# Patient Record
Sex: FEMALE | Race: WHITE | NOT HISPANIC OR LATINO | URBAN - METROPOLITAN AREA
[De-identification: names, ages, dates, MRNs, and addresses within clinical notes are randomized per-mention and may not be internally consistent; named-entity substitution may affect disease eponyms.]

---

## 2020-06-04 ENCOUNTER — OFFICE VISIT (OUTPATIENT)
Dept: URBAN - METROPOLITAN AREA CLINIC 113 | Facility: CLINIC | Age: 85
End: 2020-06-04

## 2020-07-25 ENCOUNTER — TELEPHONE ENCOUNTER (OUTPATIENT)
Dept: URBAN - METROPOLITAN AREA CLINIC 13 | Facility: CLINIC | Age: 85
End: 2020-07-25

## 2020-07-25 RX ORDER — DICYCLOMINE HYDROCHLORIDE 10 MG/1
TAKE 1 CAPSULE AS NEEDED EVERY 6 HOURS CAPSULE ORAL
Qty: 60 | Refills: 0 | OUTPATIENT
Start: 2019-01-08 | End: 2020-06-04

## 2020-07-25 RX ORDER — OLMESARTAN MEDOXOMIL-HYDROCHLOROTHIAZIDE 12.5; 2 MG/1; MG/1
TAKE 1 TABLET DAILY TABLET, FILM COATED ORAL
Refills: 0 | OUTPATIENT
End: 2019-01-04

## 2020-07-26 ENCOUNTER — TELEPHONE ENCOUNTER (OUTPATIENT)
Dept: URBAN - METROPOLITAN AREA CLINIC 13 | Facility: CLINIC | Age: 85
End: 2020-07-26

## 2020-07-26 RX ORDER — OSPEMIFENE 60 MG/1
TAKE 1 TABLET BY MOUTH EVERY DAY WITH FOOD TABLET, FILM COATED ORAL
Qty: 10 | Refills: 0 | Status: ACTIVE | COMMUNITY
Start: 2020-02-10

## 2020-07-26 RX ORDER — AZITHROMYCIN DIHYDRATE 250 MG/1
TABLET, FILM COATED ORAL
Qty: 6 | Refills: 0 | Status: ACTIVE | COMMUNITY
Start: 2018-04-26

## 2020-07-26 RX ORDER — BENZONATATE 200 MG/1
TAKE 1 CAPSULE BY MOUTH EVERY DAY 3 TIMES A DAY AS NEEDED FOR COUGH, N CAPSULE ORAL
Qty: 30 | Refills: 0 | Status: ACTIVE | COMMUNITY
Start: 2019-11-04

## 2020-07-26 RX ORDER — BENZONATATE 100 MG/1
TAKE 1 CAPSULE (100 MG TOTAL) BY MOUTH 2 (TWO) TIMES DAILY AS NEEDED F CAPSULE ORAL
Qty: 20 | Refills: 0 | Status: ACTIVE | COMMUNITY
Start: 2019-04-26

## 2020-07-26 RX ORDER — CLINDAMYCIN HYDROCHLORIDE 300 MG/1
TAKE 1 CAPSULE BY MOUTH THREE TIMES A DAY TILL GONE CAPSULE ORAL
Qty: 21 | Refills: 0 | Status: ACTIVE | COMMUNITY
Start: 2019-06-17

## 2020-07-26 RX ORDER — HYDROCHLOROTHIAZIDE 12.5 MG/1
TAKE 1 TABLET DAILY IN THE MORNING TABLET ORAL
Refills: 0 | Status: ACTIVE | COMMUNITY
Start: 2018-03-16

## 2020-07-26 RX ORDER — NITROFURANTOIN MONOHYDRATE/MACROCRYSTALLINE 25; 75 MG/1; MG/1
CAPSULE ORAL
Qty: 10 | Refills: 0 | Status: ACTIVE | COMMUNITY
Start: 2019-09-06

## 2020-07-26 RX ORDER — DOXYCYCLINE 100 MG/1
CAPSULE ORAL
Qty: 20 | Refills: 0 | Status: ACTIVE | COMMUNITY
Start: 2019-07-03

## 2020-07-26 RX ORDER — OLMESARTAN MEDOXOMIL 20 MG/1
TAKE 1 TABLET DAILY TABLET, FILM COATED ORAL
Refills: 0 | Status: ACTIVE | COMMUNITY
Start: 2018-04-27

## 2020-07-26 RX ORDER — DOXYCYCLINE HYCLATE 100 MG/1
TABLET ORAL
Qty: 14 | Refills: 0 | Status: ACTIVE | COMMUNITY
Start: 2019-12-24

## 2020-07-26 RX ORDER — METHYLPREDNISOLONE 4 MG/1
TAKE 6 TABLETS ON DAY 1 AS DIRECTED ON PACKAGE AND DECREASE BY 1 TAB E TABLET ORAL
Qty: 21 | Refills: 0 | Status: ACTIVE | COMMUNITY
Start: 2019-07-01

## 2020-07-26 RX ORDER — AZITHROMYCIN DIHYDRATE 250 MG/1
TABLET, FILM COATED ORAL
Qty: 6 | Refills: 0 | Status: ACTIVE | COMMUNITY
Start: 2019-05-17

## 2020-07-26 RX ORDER — NEOMYCIN SULFATE, POLYMYXIN B SULFATE AND DEXAMETHASONE 3.5; 10000; 1 MG/ML; [USP'U]/ML; MG/ML
SUSPENSION/ DROPS OPHTHALMIC
Qty: 5 | Refills: 0 | Status: ACTIVE | COMMUNITY
Start: 2018-11-06

## 2020-07-26 RX ORDER — DOXYCYCLINE HYCLATE 100 MG/1
TAKE 1 TABLET BY MOUTH TWICE A DAY FOR 10 DAYS TABLET ORAL
Qty: 20 | Refills: 0 | Status: ACTIVE | COMMUNITY
Start: 2018-08-29

## 2020-07-26 RX ORDER — PREDNISONE 20 MG/1
TABLET ORAL
Qty: 5 | Refills: 0 | Status: ACTIVE | COMMUNITY
Start: 2019-05-02

## 2020-07-26 RX ORDER — CLINDAMYCIN HYDROCHLORIDE 300 MG/1
TAKE 1 CAPSULE BY MOUTH 3 TIMES A DAY FOR 10 DAYS WITH A PROBIOTIC CAPSULE ORAL
Qty: 30 | Refills: 0 | Status: ACTIVE | COMMUNITY
Start: 2018-11-06

## 2020-07-26 RX ORDER — FUROSEMIDE 20 MG/1
TAKE 1 TABLET (20 MG TOTAL) BY MOUTH 2 (TWO) TIMES DAILY FOR 10 DAYS TABLET ORAL
Qty: 20 | Refills: 0 | Status: ACTIVE | COMMUNITY
Start: 2020-04-26

## 2020-07-26 RX ORDER — FLUCONAZOLE 150 MG/1
TABLET ORAL
Qty: 1 | Refills: 0 | Status: ACTIVE | COMMUNITY
Start: 2018-09-17

## 2020-07-26 RX ORDER — NITROFURANTOIN 100 MG/1
CAPSULE ORAL
Qty: 10 | Refills: 0 | Status: ACTIVE | COMMUNITY
Start: 2019-03-13

## 2020-07-26 RX ORDER — FOSFOMYCIN TROMETHAMINE 3 G/1
TK 1 PACKET PO ONCE POWDER ORAL
Qty: 1 | Refills: 0 | Status: ACTIVE | COMMUNITY
Start: 2019-09-05

## 2020-07-26 RX ORDER — NEOMYCIN SULFATE, POLYMYXIN B SULFATE AND DEXAMETHASONE 3.5; 10000; 1 MG/ML; [USP'U]/ML; MG/ML
PLEASE CLARIFY DIRECTION SUSPENSION/ DROPS OPHTHALMIC
Qty: 5 | Refills: 0 | Status: ACTIVE | COMMUNITY
Start: 2019-07-18

## 2020-07-26 RX ORDER — HYDROCODONE BITARTRATE AND ACETAMINOPHEN 5; 325 MG/1; MG/1
TABLET ORAL
Qty: 18 | Refills: 0 | Status: ACTIVE | COMMUNITY
Start: 2019-06-06

## 2020-07-26 RX ORDER — VANCOMYCIN HYDROCHLORIDE 125 MG/1
CAPSULE ORAL
Qty: 40 | Refills: 0 | Status: ACTIVE | COMMUNITY
Start: 2019-07-09

## 2020-07-26 RX ORDER — HYDROCHLOROTHIAZIDE 12.5 MG/1
TAKE 1 CAPSULE (12.5 MG TOTAL) BY MOUTH EVERY OTHER DAY CAPSULE ORAL
Qty: 45 | Refills: 0 | Status: ACTIVE | COMMUNITY
Start: 2020-04-07

## 2020-07-26 RX ORDER — CONJUGATED ESTROGENS 0.62 MG/G
CREAM VAGINAL
Qty: 30 | Refills: 0 | Status: ACTIVE | COMMUNITY
Start: 2019-04-01

## 2020-07-26 RX ORDER — AZITHROMYCIN DIHYDRATE 250 MG/1
TABLET, FILM COATED ORAL
Qty: 6 | Refills: 0 | Status: ACTIVE | COMMUNITY
Start: 2018-06-15

## 2020-07-26 RX ORDER — HYDROCODONE BITARTRATE AND HOMATROPINE METHYLBROMIDE 5; 1.5 MG/5ML; MG/5ML
TAKE 5MLS BY MOUTH EVERY 6 HOURS AS NEEDED FOR UP TO 10 DAYS SYRUP ORAL
Qty: 240 | Refills: 0 | Status: ACTIVE | COMMUNITY
Start: 2020-01-07

## 2020-07-26 RX ORDER — DIPHENOXYLATE HYDROCHLORIDE AND ATROPINE SULFATE 2.5; .025 MG/1; MG/1
TAKE ONE TABLET BY MOUTH 4 TIMES A DAY AS NEEDED FOR DIARRHEA TABLET ORAL
Qty: 20 | Refills: 0 | Status: ACTIVE | COMMUNITY
Start: 2020-03-20

## 2020-07-26 RX ORDER — AZITHROMYCIN DIHYDRATE 250 MG/1
TAKE 2 TABS ON DAY ONE THEN ONE TAB DAILY FOR REMAINING FOUR DAYS TABLET, FILM COATED ORAL
Qty: 6 | Refills: 0 | Status: ACTIVE | COMMUNITY
Start: 2020-05-11

## 2020-11-04 ENCOUNTER — OFFICE VISIT (OUTPATIENT)
Dept: URBAN - METROPOLITAN AREA CLINIC 113 | Facility: CLINIC | Age: 85
End: 2020-11-04
Payer: MEDICARE

## 2020-11-04 VITALS
WEIGHT: 115 LBS | DIASTOLIC BLOOD PRESSURE: 78 MMHG | HEIGHT: 61 IN | SYSTOLIC BLOOD PRESSURE: 152 MMHG | RESPIRATION RATE: 18 BRPM | HEART RATE: 84 BPM | BODY MASS INDEX: 21.71 KG/M2 | TEMPERATURE: 97.4 F

## 2020-11-04 DIAGNOSIS — R14.0 ABDOMINAL BLOATING: ICD-10-CM

## 2020-11-04 DIAGNOSIS — K59.09 OTHER CONSTIPATION: ICD-10-CM

## 2020-11-04 PROCEDURE — G8427 DOCREV CUR MEDS BY ELIG CLIN: HCPCS | Performed by: NURSE PRACTITIONER

## 2020-11-04 PROCEDURE — 99213 OFFICE O/P EST LOW 20 MIN: CPT | Performed by: NURSE PRACTITIONER

## 2020-11-04 RX ORDER — FUROSEMIDE 20 MG/1
TAKE 1 TABLET (20 MG TOTAL) BY MOUTH 2 (TWO) TIMES DAILY FOR 10 DAYS TABLET ORAL
Qty: 20 | Refills: 0 | Status: ACTIVE | COMMUNITY
Start: 2020-04-26

## 2020-11-04 RX ORDER — HYDROCHLOROTHIAZIDE 12.5 MG/1
TAKE 1 TABLET DAILY IN THE MORNING TABLET ORAL
Refills: 0 | Status: ACTIVE | COMMUNITY
Start: 2018-03-16

## 2020-11-04 NOTE — HPI-TODAY'S VISIT:
This is an 85-year-old female with a history of colon cancer status post partial colectomy, hypertension, prediabetes, bloating, and abdominal discomfort presenting for follow-up.    She was last seen 6/4/2020.  She had tried MiraLAX for constipation and reported forceful bowel movements.  She returned to Colace twice a day and was having satisfying bowel movements.  She denied rectal bleeding.  She had recurrent abdominal hernias for which she was not a surgical candidate.  She was given samples of align for a complaint of bloating. She did not try a probiotic.  She reports worsening bloating over the last 3 weeks.  She states she feels bloated all the time.  She has also noticed fat along her back that she has not had before.  She has not changed her diet.  She has gained 4 pounds.  She reports intermittent pressure type discomfort associated with bloating.  She is taking Colace every evening.  She is having 4 or 5 bowel movements a week.  Occasionally, she has 2 days when she does not have a bowel movement.  Her stools are frequently hard and even small pieces.  She reports discomfort in her rectum associated with being constipated.  She has minimal gas.  She denies any other abdominal symptoms.

## 2020-12-22 ENCOUNTER — OFFICE VISIT (OUTPATIENT)
Dept: URBAN - METROPOLITAN AREA CLINIC 113 | Facility: CLINIC | Age: 85
End: 2020-12-22
Payer: MEDICARE

## 2020-12-22 VITALS
WEIGHT: 119 LBS | HEART RATE: 72 BPM | TEMPERATURE: 98 F | RESPIRATION RATE: 18 BRPM | HEIGHT: 61 IN | BODY MASS INDEX: 22.47 KG/M2

## 2020-12-22 DIAGNOSIS — K64.1 GRADE II HEMORRHOIDS: ICD-10-CM

## 2020-12-22 DIAGNOSIS — R14.0 ABDOMINAL BLOATING: ICD-10-CM

## 2020-12-22 DIAGNOSIS — K59.09 OTHER CONSTIPATION: ICD-10-CM

## 2020-12-22 PROCEDURE — 99213 OFFICE O/P EST LOW 20 MIN: CPT | Performed by: INTERNAL MEDICINE

## 2020-12-22 RX ORDER — SIMETHICONE 125 MG/1
1 TABLET AFTER MEALS AND AT BEDTIME AS NEEDED TABLET, CHEWABLE ORAL
Qty: 120 | Refills: 1 | OUTPATIENT

## 2020-12-22 RX ORDER — FUROSEMIDE 20 MG/1
TAKE 1 TABLET (20 MG TOTAL) BY MOUTH 2 (TWO) TIMES DAILY FOR 10 DAYS TABLET ORAL
Qty: 20 | Refills: 0 | Status: ACTIVE | COMMUNITY
Start: 2020-04-26

## 2020-12-22 RX ORDER — HYDROCHLOROTHIAZIDE 12.5 MG/1
TAKE 1 TABLET DAILY IN THE MORNING TABLET ORAL
Refills: 0 | Status: ACTIVE | COMMUNITY
Start: 2018-03-16

## 2020-12-22 NOTE — HPI-TODAY'S VISIT:
86 y/o female presenting for a f/u visit. She was last seen in the clinic about 6 weeks ago. She was recommended to follow a Low-Fodmap diet as well as daily fiber/probiotic at her last visit. She continues to have intermittent bloating and abdominal discomfort. She also has hemorrhoids. She had hemorrhoids last year which were managed conservatively.   She did have an abdominal Xray which was unremarkable.   11/4/2020 This is an 85-year-old female with a history of colon cancer status post partial colectomy, hypertension, prediabetes, bloating, and abdominal discomfort presenting for follow-up.    She was last seen 6/4/2020.  She had tried MiraLAX for constipation and reported forceful bowel movements.  She returned to Colace twice a day and was having satisfying bowel movements.  She denied rectal bleeding.  She had recurrent abdominal hernias for which she was not a surgical candidate.  She was given samples of align for a complaint of bloating. She did not try a probiotic.  She reports worsening bloating over the last 3 weeks.  She states she feels bloated all the time.  She has also noticed fat along her back that she has not had before.  She has not changed her diet.  She has gained 4 pounds.  She reports intermittent pressure type discomfort associated with bloating.  She is taking Colace every evening.  She is having 4 or 5 bowel movements a week.  Occasionally, she has 2 days when she does not have a bowel movement.  Her stools are frequently hard and even small pieces.  She reports discomfort in her rectum associated with being constipated.  She has minimal gas.  She denies any other abdominal symptoms.

## 2021-01-22 ENCOUNTER — WEB ENCOUNTER (OUTPATIENT)
Dept: URBAN - METROPOLITAN AREA CLINIC 113 | Facility: CLINIC | Age: 86
End: 2021-01-22

## 2021-01-22 ENCOUNTER — OFFICE VISIT (OUTPATIENT)
Dept: URBAN - METROPOLITAN AREA CLINIC 113 | Facility: CLINIC | Age: 86
End: 2021-01-22
Payer: MEDICARE

## 2021-01-22 VITALS
DIASTOLIC BLOOD PRESSURE: 80 MMHG | SYSTOLIC BLOOD PRESSURE: 162 MMHG | TEMPERATURE: 98.2 F | HEART RATE: 82 BPM | RESPIRATION RATE: 18 BRPM | WEIGHT: 121 LBS | BODY MASS INDEX: 22.84 KG/M2 | HEIGHT: 61 IN

## 2021-01-22 DIAGNOSIS — K59.09 OTHER CONSTIPATION: ICD-10-CM

## 2021-01-22 DIAGNOSIS — R19.4 CHANGE IN BOWEL HABITS: ICD-10-CM

## 2021-01-22 DIAGNOSIS — R14.0 ABDOMINAL BLOATING: ICD-10-CM

## 2021-01-22 PROCEDURE — G8427 DOCREV CUR MEDS BY ELIG CLIN: HCPCS | Performed by: NURSE PRACTITIONER

## 2021-01-22 PROCEDURE — G9903 PT SCRN TBCO ID AS NON USER: HCPCS | Performed by: NURSE PRACTITIONER

## 2021-01-22 PROCEDURE — 99213 OFFICE O/P EST LOW 20 MIN: CPT | Performed by: NURSE PRACTITIONER

## 2021-01-22 RX ORDER — FUROSEMIDE 20 MG/1
TAKE 1 TABLET (20 MG TOTAL) BY MOUTH 2 (TWO) TIMES DAILY FOR 10 DAYS TABLET ORAL
Qty: 20 | Refills: 0 | Status: ACTIVE | COMMUNITY
Start: 2020-04-26

## 2021-01-22 RX ORDER — SIMETHICONE 125 MG/1
1 TABLET AFTER MEALS AND AT BEDTIME AS NEEDED TABLET, CHEWABLE ORAL
Qty: 120 | Refills: 1 | Status: ACTIVE | COMMUNITY

## 2021-01-22 RX ORDER — HYDROCHLOROTHIAZIDE 12.5 MG/1
TAKE 1 TABLET DAILY IN THE MORNING TABLET ORAL
Refills: 0 | Status: ACTIVE | COMMUNITY
Start: 2018-03-16

## 2021-01-22 NOTE — HPI-TODAY'S VISIT:
This is an 85-year-old female with a history of colon cancer status post partial colectomy, hypertension, prediabetes, bloating, abdominal discomfort, constipation, and internal hemorrhoids presenting for follow-up.  She was last seen 12/22/20.  She had persistent bloating will likely related to her diet.  She was prescribed simethicone chewables.  She was to try hydrocortisone suppositories for hemorrhoidal symptoms.  Hemorrhoidal banding was a consideration.  At a previous visit in November, she was instructed to begin a diet low in fermentable sugars and begin a daily probiotic that had been recommended at a previous visit.  She was to try daily fiber with milk of magnesia as needed for constipation.  She continues to complain of bloating, particularly in the lower part of her abdomen.  She denies associated pain.  She denies gas.  She has experienced a recent change in bowel habits.  She has a bowel movement every day with a sensation of incomplete evacuation.  Later, she will return to the bathroom for a bowel movement and will pass "mushy" stools that is orange in color with black specks.  This stool is particularly malodorous.  She denies diarrhea.  She is taking milk of magnesia every other day to control constipation.  She denies any other abdominal symptoms.  She denies weight loss.  She denies recent antibiotic use.

## 2021-04-21 ENCOUNTER — OFFICE VISIT (OUTPATIENT)
Dept: URBAN - METROPOLITAN AREA CLINIC 113 | Facility: CLINIC | Age: 86
End: 2021-04-21
Payer: MEDICARE

## 2021-04-21 VITALS
BODY MASS INDEX: 22.28 KG/M2 | HEIGHT: 61 IN | HEART RATE: 76 BPM | DIASTOLIC BLOOD PRESSURE: 82 MMHG | TEMPERATURE: 98.3 F | SYSTOLIC BLOOD PRESSURE: 149 MMHG | WEIGHT: 118 LBS

## 2021-04-21 DIAGNOSIS — R14.0 BLOATING: ICD-10-CM

## 2021-04-21 DIAGNOSIS — R10.2 PELVIC PAIN: ICD-10-CM

## 2021-04-21 DIAGNOSIS — R10.84 GENERALIZED ABDOMINAL PAIN: ICD-10-CM

## 2021-04-21 PROCEDURE — 99214 OFFICE O/P EST MOD 30 MIN: CPT | Performed by: INTERNAL MEDICINE

## 2021-04-21 RX ORDER — SIMETHICONE 125 MG/1
1 TABLET AFTER MEALS AND AT BEDTIME AS NEEDED TABLET, CHEWABLE ORAL
Qty: 120 | Refills: 1 | Status: ACTIVE | COMMUNITY

## 2021-04-21 RX ORDER — FUROSEMIDE 20 MG/1
TAKE 1 TABLET (20 MG TOTAL) BY MOUTH 2 (TWO) TIMES DAILY FOR 10 DAYS TABLET ORAL
Qty: 20 | Refills: 0 | Status: ON HOLD | COMMUNITY
Start: 2020-04-26

## 2021-04-21 RX ORDER — HYDROCHLOROTHIAZIDE 12.5 MG/1
TAKE 1 TABLET DAILY IN THE MORNING TABLET ORAL
Refills: 0 | Status: ACTIVE | COMMUNITY
Start: 2018-03-16

## 2021-04-21 NOTE — HPI-TODAY'S VISIT:
85-year-old female presenting for follow-up.  She was previously seen in the clinic in January 2021. She currently has a bladder infection. She has rectal burning intermittently. She has not been feeling well for the past few weeks.   She has been habing increased abdominal and pelvic swelling. She was dx with a UTI and was placed on antibiotics. She does not feel like the abx are helping any. She is being referred to the urologist as well. She does not remember every having a urinary tract infection.   1/22/2021 This is an 85-year-old female with a history of colon cancer status post partial colectomy, hypertension, prediabetes, bloating, abdominal discomfort, constipation, and internal hemorrhoids presenting for follow-up.  She was last seen 12/22/20.  She had persistent bloating will likely related to her diet.  She was prescribed simethicone chewables.  She was to try hydrocortisone suppositories for hemorrhoidal symptoms.  Hemorrhoidal banding was a consideration.  At a previous visit in November, she was instructed to begin a diet low in fermentable sugars and begin a daily probiotic that had been recommended at a previous visit.  She was to try daily fiber with milk of magnesia as needed for constipation.  She continues to complain of bloating, particularly in the lower part of her abdomen.  She denies associated pain.  She denies gas.  She has experienced a recent change in bowel habits.  She has a bowel movement every day with a sensation of incomplete evacuation.  Later, she will return to the bathroom for a bowel movement and will pass "mushy" stools that is orange in color with black specks.  This stool is particularly malodorous.  She denies diarrhea.  She is taking milk of magnesia every other day to control constipation.  She denies any other abdominal symptoms.  She denies weight loss.  She denies recent antibiotic use.

## 2021-05-10 ENCOUNTER — TELEPHONE ENCOUNTER (OUTPATIENT)
Dept: URBAN - METROPOLITAN AREA CLINIC 113 | Facility: CLINIC | Age: 86
End: 2021-05-10

## 2021-05-11 ENCOUNTER — TELEPHONE ENCOUNTER (OUTPATIENT)
Dept: URBAN - METROPOLITAN AREA CLINIC 113 | Facility: CLINIC | Age: 86
End: 2021-05-11

## 2021-06-07 ENCOUNTER — OFFICE VISIT (OUTPATIENT)
Dept: URBAN - METROPOLITAN AREA CLINIC 113 | Facility: CLINIC | Age: 86
End: 2021-06-07
Payer: MEDICARE

## 2021-06-07 VITALS
WEIGHT: 117 LBS | HEIGHT: 61 IN | HEART RATE: 67 BPM | RESPIRATION RATE: 20 BRPM | TEMPERATURE: 98.2 F | DIASTOLIC BLOOD PRESSURE: 64 MMHG | SYSTOLIC BLOOD PRESSURE: 120 MMHG | BODY MASS INDEX: 22.09 KG/M2

## 2021-06-07 DIAGNOSIS — K59.09 OTHER CONSTIPATION: ICD-10-CM

## 2021-06-07 DIAGNOSIS — R14.0 ABDOMINAL BLOATING: ICD-10-CM

## 2021-06-07 DIAGNOSIS — R10.32 LEFT LOWER QUADRANT ABDOMINAL PAIN: ICD-10-CM

## 2021-06-07 PROCEDURE — 99213 OFFICE O/P EST LOW 20 MIN: CPT | Performed by: INTERNAL MEDICINE

## 2021-06-07 RX ORDER — FUROSEMIDE 20 MG/1
TAKE 1 TABLET (20 MG TOTAL) BY MOUTH 2 (TWO) TIMES DAILY FOR 10 DAYS TABLET ORAL
Qty: 20 | Refills: 0 | Status: ON HOLD | COMMUNITY
Start: 2020-04-26

## 2021-06-07 RX ORDER — OLMESARTAN MEDOXOMIL 20 MG/1
1 TABLET TABLET ORAL ONCE A DAY
Status: ACTIVE | COMMUNITY

## 2021-06-07 RX ORDER — HYDROCHLOROTHIAZIDE 12.5 MG/1
TAKE 1 TABLET DAILY IN THE MORNING TABLET ORAL
Refills: 0 | Status: ACTIVE | COMMUNITY
Start: 2018-03-16

## 2021-06-07 RX ORDER — SIMETHICONE 125 MG/1
1 TABLET AFTER MEALS AND AT BEDTIME AS NEEDED TABLET, CHEWABLE ORAL
Qty: 120 | Refills: 1 | Status: ACTIVE | COMMUNITY

## 2021-06-07 NOTE — HPI-TODAY'S VISIT:
This is an 85-year-old female with a history of colon cancer status post partial colectomy, hypertension, prediabetes, bloating, abdominal discomfort, constipation, and internal hemorrhoids presenting for follow-up.   She was last seen 4/21/2021.  At the time, she had a bladder infection and reported intermittent rectal burning.  She had been feeling unwell for several weeks.  She reported increased abdominal and pelvic swelling.  She was compliant with antibiotics for the UTI but did not feel as though her symptoms were improving.  She was awaiting for an appointment with a neurologist.  She had previously undergone an abdominal film that showed small bowel air-fluid levels.  Intermittent ileus was a consideration.  Functional abdominal pain with intermittent constipation was also within the differential.  She was scheduled for a CT scan.  She continues to report intermittent episodes of left lower quadrant pain.  She reports occasional "attacks" that are usually relieved with walking.  These episodes are unassociated with meals.  She reports "discomfort" associated with constipation.  She remains concerned regarding the appearance of the asymmetry in her lower abdomen.  She continues to report constipation.  She is on Colace every day.  She reports a small, hard stools over the last 7 days.  She feels a swollen in the left lower abdomen and is "uncomfortable."  She has tried Benefiber in the past and reports it caused nausea.  She has not tried taking milk of magnesia although this has been previously recommended.  She reports recurrent urinary tract infections.  She had been prescribed a suppository.  She denies other abdominal symptoms.

## 2021-06-07 NOTE — HPI-OTHER HISTORIES
CT of the abdomen and pelvis without contrast 5/12/2021:No acute findings in the abdomen and pelvis.  Stable mild atelectasis and stable basilar emphysema.  Small ill-defined bilateral upper pole renal cysts.  Colonic diverticulosis without evidence of diverticulitis.  Stable postsurgical changes of left abdominal wall mesh.  Small fat filled bilateral inguinal hernias and stable uterine calcifications.  Stable DISH, multilevel disc height loss, grade 1 anterolisthesis L4 on L5.  Stable aortoiliac atherosclerotic calcifications.

## 2021-07-28 ENCOUNTER — OFFICE VISIT (OUTPATIENT)
Dept: URBAN - METROPOLITAN AREA CLINIC 113 | Facility: CLINIC | Age: 86
End: 2021-07-28
Payer: MEDICARE

## 2021-07-28 VITALS
HEART RATE: 71 BPM | HEIGHT: 61 IN | BODY MASS INDEX: 21.71 KG/M2 | SYSTOLIC BLOOD PRESSURE: 123 MMHG | RESPIRATION RATE: 18 BRPM | WEIGHT: 115 LBS | TEMPERATURE: 98 F | DIASTOLIC BLOOD PRESSURE: 73 MMHG

## 2021-07-28 DIAGNOSIS — R14.0 ABDOMINAL BLOATING: ICD-10-CM

## 2021-07-28 DIAGNOSIS — R10.32 LEFT LOWER QUADRANT ABDOMINAL PAIN: ICD-10-CM

## 2021-07-28 DIAGNOSIS — K59.09 OTHER CONSTIPATION: ICD-10-CM

## 2021-07-28 PROCEDURE — 99214 OFFICE O/P EST MOD 30 MIN: CPT | Performed by: INTERNAL MEDICINE

## 2021-07-28 RX ORDER — SIMETHICONE 125 MG/1
1 TABLET AFTER MEALS AND AT BEDTIME AS NEEDED TABLET, CHEWABLE ORAL
Qty: 120 | Refills: 1 | Status: DISCONTINUED | COMMUNITY

## 2021-07-28 RX ORDER — HYDROCHLOROTHIAZIDE 12.5 MG/1
TAKE 1 TABLET DAILY IN THE MORNING TABLET ORAL
Refills: 0 | Status: ACTIVE | COMMUNITY
Start: 2018-03-16

## 2021-07-28 RX ORDER — OLMESARTAN MEDOXOMIL 20 MG/1
1 TABLET TABLET ORAL ONCE A DAY
Status: ACTIVE | COMMUNITY

## 2021-07-28 RX ORDER — FUROSEMIDE 20 MG/1
TAKE 1 TABLET (20 MG TOTAL) BY MOUTH 2 (TWO) TIMES DAILY FOR 10 DAYS TABLET ORAL
Qty: 20 | Refills: 0 | Status: DISCONTINUED | COMMUNITY
Start: 2020-04-26

## 2021-07-28 NOTE — HPI-TODAY'S VISIT:
This is  an 85-year-old female with a history of colon cancer status post partial colectomy, hypertension, prediabetes, bloating, abdominal discomfort, constipation, internal hemorrhoids presenting for follow-up.  She was last seen 6/7/2021.  She had undergone a CT scan in May demonstrating no acute findings.  She reported persistent left lower quadrant abdominal pain.  It was discussed this was likely functional associated with constipation.  There was thought there may be some contribution from the fat-containing inguinal hernia or surgical adhesions.  She had mild abdominal asymmetry associated with fat deposition discussed likely alteration from prior surgeries.  She was to continue daily Colace.  She was instructed to start MiraLAX daily which she had on hand and use milk of magnesia as needed. She reports nausea associated with taking MiraLAX.  Currently, she is taking Colace twice a day.  Since March, her bowel movements are "long and skinny" or she is having diarrhea or she is not having a bowel movement.  She initially stopped taking fiber.  She took daily MiraLAX which resulted in diarrhea.  She stopped taking it.  Now, she is having diarrhea or she is having "long skinny stools."  She continues to report bloating and left lower quadrant discomfort associated with constipation.  This usually begins when she has not had a bowel movement for 2 days.  Afterward, she will have hard, small stools.  She reports less symptoms when her bowels are moving well.  She denies red blood per rectum.  She denies any other abdominal symptoms.

## 2021-08-09 ENCOUNTER — OFFICE VISIT (OUTPATIENT)
Dept: URBAN - METROPOLITAN AREA CLINIC 113 | Facility: CLINIC | Age: 86
End: 2021-08-09

## 2021-09-22 ENCOUNTER — OFFICE VISIT (OUTPATIENT)
Dept: URBAN - METROPOLITAN AREA CLINIC 113 | Facility: CLINIC | Age: 86
End: 2021-09-22
Payer: MEDICARE

## 2021-09-22 VITALS
HEART RATE: 72 BPM | HEIGHT: 61 IN | BODY MASS INDEX: 22.47 KG/M2 | RESPIRATION RATE: 18 BRPM | WEIGHT: 119 LBS | SYSTOLIC BLOOD PRESSURE: 148 MMHG | TEMPERATURE: 97.9 F | DIASTOLIC BLOOD PRESSURE: 68 MMHG

## 2021-09-22 DIAGNOSIS — K64.4 ANAL SKIN TAG: ICD-10-CM

## 2021-09-22 DIAGNOSIS — R10.32 LEFT LOWER QUADRANT ABDOMINAL PAIN: ICD-10-CM

## 2021-09-22 DIAGNOSIS — K62.89 PROCTALGIA: ICD-10-CM

## 2021-09-22 DIAGNOSIS — R14.0 ABDOMINAL BLOATING: ICD-10-CM

## 2021-09-22 DIAGNOSIS — K59.09 OTHER CONSTIPATION: ICD-10-CM

## 2021-09-22 PROCEDURE — 99204 OFFICE O/P NEW MOD 45 MIN: CPT | Performed by: NURSE PRACTITIONER

## 2021-09-22 RX ORDER — OLMESARTAN MEDOXOMIL 20 MG/1
1 TABLET TABLET ORAL ONCE A DAY
Status: ACTIVE | COMMUNITY

## 2021-09-22 RX ORDER — HYDROCHLOROTHIAZIDE 12.5 MG/1
TAKE 1 TABLET DAILY IN THE MORNING TABLET ORAL
Refills: 0 | Status: ACTIVE | COMMUNITY
Start: 2018-03-16

## 2021-09-22 NOTE — HPI-TODAY'S VISIT:
This is an 86-year-old female with a history of colon cancer status post partial colectomy, hypertension, prediabetes, bloating, abdominal discomfort, constipation, and internal hemorrhoids presenting for follow-up. She was last seen 7/28/2021.  She had been using MiraLAX for constipation and reported nausea associated with dosing.  She was taking Colace twice a day.  She reported "long and skinny" bowel movements or diarrhea or failure to have a bowel movement since March.  She tried daily MiraLAX again which resulted in diarrhea and discontinued it.  She continued to report bloating and left lower quadrant discomfort associated with constipation.  She admitted less symptoms when her bowels are moving well.  It was discussed that left lower quadrant pain and bloating or associated with a constipation predominant functional bowel disorder.  It was recommended that she try a low dose of Linzess daily.  If this was ineffective, she was to increase to 2/day and call for prescription. She continues to report frequent abdominal bloating more so on the left side of her abdomen than the right associated with discomfort but not pain.  She is taking Colace daily and is using Linzess 72 mcg 2-3 times a week.  She has multiple bowel movements after dose preventing her from taking it regularly.  She reports improvement of her abdominal discomfort and bloating after using Linzess.  She is experiencing proctalgia reporting perianal irritation that she attributes to a "hemorrhoid that is out."  She has been experiencing symptoms for a month.  She has not tried over-the-counter medication.  She denies red blood per rectum.  She denies any other abdominal symptoms.  She reports an area of irritation indicated in the area of her sacrum. She also reports recent to swelling in her feet.  She believes it is present at all times.  She denies dyspnea or orthopnea.  She is encouraged to follow-up with her primary care physician in this regard.

## 2021-09-22 NOTE — PHYSICAL EXAM RECTAL:
no external hemorrhoids , no masses palpable , no melena , no red blood , tone decreased , Skin tags are present. , No tenderness on SABRINA

## 2021-09-26 PROBLEM — 195469007: Status: ACTIVE | Noted: 2021-09-26

## 2021-09-26 PROBLEM — 77880009: Status: ACTIVE | Noted: 2021-09-26

## 2021-09-26 PROBLEM — 30473006: Status: ACTIVE | Noted: 2021-04-21

## 2021-09-26 PROBLEM — 721704005: Status: ACTIVE | Noted: 2020-12-22

## 2021-09-26 PROBLEM — 129851009: Status: ACTIVE | Noted: 2021-01-22

## 2021-09-26 PROBLEM — 116289008: Status: ACTIVE | Noted: 2021-04-21

## 2021-12-22 ENCOUNTER — OFFICE VISIT (OUTPATIENT)
Dept: URBAN - METROPOLITAN AREA CLINIC 113 | Facility: CLINIC | Age: 86
End: 2021-12-22
Payer: MEDICARE

## 2021-12-22 VITALS
HEART RATE: 62 BPM | HEIGHT: 61 IN | TEMPERATURE: 98 F | DIASTOLIC BLOOD PRESSURE: 72 MMHG | RESPIRATION RATE: 20 BRPM | SYSTOLIC BLOOD PRESSURE: 152 MMHG | WEIGHT: 120 LBS | BODY MASS INDEX: 22.66 KG/M2

## 2021-12-22 DIAGNOSIS — K59.09 OTHER CONSTIPATION: ICD-10-CM

## 2021-12-22 DIAGNOSIS — R10.32 LEFT LOWER QUADRANT ABDOMINAL PAIN: ICD-10-CM

## 2021-12-22 DIAGNOSIS — R14.0 ABDOMINAL BLOATING: ICD-10-CM

## 2021-12-22 PROCEDURE — 99213 OFFICE O/P EST LOW 20 MIN: CPT | Performed by: NURSE PRACTITIONER

## 2021-12-22 RX ORDER — DOCUSATE SODIUM 100 MG/1
1-2 CAPSULES CAPSULE, LIQUID FILLED ORAL DAILY
Status: ACTIVE | COMMUNITY

## 2021-12-22 RX ORDER — POLYETHYLENE GLYCOL 3350 17 G/17G
AS DIRECTED POWDER, FOR SOLUTION ORAL AS NEEDED
Status: ACTIVE | COMMUNITY

## 2021-12-22 RX ORDER — OLMESARTAN MEDOXOMIL 20 MG/1
1 TABLET TABLET ORAL ONCE A DAY
Status: ACTIVE | COMMUNITY

## 2021-12-22 RX ORDER — LINACLOTIDE 72 UG/1
1 CAPSULE AT LEAST 30 MINUTES BEFORE THE FIRST MEAL OF THE DAY ON AN EMPTY STOMACH CAPSULE, GELATIN COATED ORAL
Status: ACTIVE | COMMUNITY

## 2021-12-22 RX ORDER — HYDROCHLOROTHIAZIDE 12.5 MG/1
TAKE 1 TABLET DAILY IN THE MORNING TABLET ORAL
Refills: 0 | Status: ACTIVE | COMMUNITY
Start: 2018-03-16

## 2021-12-22 NOTE — HPI-TODAY'S VISIT:
This is an 86-year-old female with a history of colon cancer status post partial colectomy, hypertension, prediabetes, abdominal bloating, left lower quadrant abdominal pain, constipation, and internal hemorrhoids presenting for follow-up.  She was last seen 9/22/2021.  She continues to report left lower quadrant pain and bloating that improved after a bowel movement.  She continues to express concern (chronic issue) regarding in a symmetrical appearance of her abdomen.  It had been determined this was associated with asymmetrical distribution of subcutaneous fat possibly associated with prior surgeries.  She was to continue Colace and continue Linzess 72 mcg as needed.  She complained of proctalgia.  Exam was notable for mildly inflamed skin tags.  SABRINA was otherwise unremarkable.  She was to begin a trial of Aquaphor.  Sacral erythema was noted.  She admitted to frequent sitting.  Early decubitus was a consideration.  She was to purchase a doughnut to use for prolonged sitting and be evaluated by her primary care provider. She states that her symptoms have improved.  She has less bloating and discomfort due to dietary modification.  She has occasional pain in the left lower abdomen that is mild and "does not linger."  She is taking Colace once a day.  Occasionally, she requires 2 doses.  Occasionally, she takes MiraLAX or low-dose Linzess.  She is having bowel movements most days.  She denies any other abdominal symptoms.

## 2021-12-23 PROBLEM — 301716002: Status: ACTIVE | Noted: 2021-06-07

## 2022-04-25 ENCOUNTER — OFFICE VISIT (OUTPATIENT)
Dept: URBAN - METROPOLITAN AREA CLINIC 113 | Facility: CLINIC | Age: 87
End: 2022-04-25
Payer: MEDICARE

## 2022-04-25 ENCOUNTER — DASHBOARD ENCOUNTERS (OUTPATIENT)
Age: 87
End: 2022-04-25

## 2022-04-25 VITALS
HEIGHT: 61 IN | DIASTOLIC BLOOD PRESSURE: 71 MMHG | RESPIRATION RATE: 18 BRPM | SYSTOLIC BLOOD PRESSURE: 121 MMHG | WEIGHT: 118 LBS | HEART RATE: 72 BPM | TEMPERATURE: 97.3 F | BODY MASS INDEX: 22.28 KG/M2

## 2022-04-25 DIAGNOSIS — R14.0 ABDOMINAL BLOATING: ICD-10-CM

## 2022-04-25 DIAGNOSIS — K59.09 OTHER CONSTIPATION: ICD-10-CM

## 2022-04-25 PROCEDURE — 99213 OFFICE O/P EST LOW 20 MIN: CPT | Performed by: NURSE PRACTITIONER

## 2022-04-25 RX ORDER — DOCUSATE SODIUM 100 MG/1
1-2 CAPSULES CAPSULE, LIQUID FILLED ORAL DAILY
Status: ACTIVE | COMMUNITY

## 2022-04-25 RX ORDER — OLMESARTAN MEDOXOMIL 20 MG/1
1 TABLET TABLET ORAL ONCE A DAY
Status: ACTIVE | COMMUNITY

## 2022-04-25 RX ORDER — LINACLOTIDE 72 UG/1
1 CAPSULE AT LEAST 30 MINUTES BEFORE THE FIRST MEAL OF THE DAY ON AN EMPTY STOMACH CAPSULE, GELATIN COATED ORAL
Status: ACTIVE | COMMUNITY

## 2022-04-25 RX ORDER — POLYETHYLENE GLYCOL 3350 17 G/17G
AS DIRECTED POWDER, FOR SOLUTION ORAL AS NEEDED
Status: ACTIVE | COMMUNITY

## 2022-04-25 RX ORDER — HYDROCHLOROTHIAZIDE 12.5 MG/1
TAKE 1 TABLET DAILY IN THE MORNING TABLET ORAL
Refills: 0 | Status: ACTIVE | COMMUNITY
Start: 2018-03-16

## 2022-04-25 NOTE — HPI-TODAY'S VISIT:
This is an 86-year-old female with a history of colon cancer status post partial colectomy, hypertension, prediabetes, abdominal bloating, left lower quadrant abdominal pain, constipation, and internal hemorrhoids presenting for follow-up. She was last seen 12/22/2021.  She reported her symptoms had improved.  She had less bloating and discomfort because of dietary modification.  She had occasional left lower quadrant pain that was mild and short-lived.  She was taking Colace 2 daily, Linzess 72 mcg daily as needed, and MiraLAX daily as needed.  She reported a bowel movement most days and was instructed to continue her current regimen. She is taking MiraLAX every other day.  She has not required Linzess since her last visit.  Due to dietary changes over Pass[place], she experienced sporadic bowel movements.  She has been having bowel movements every day.  She reports the appearance of swelling on the left side of her abdomen.  She denies associated pain.  She denies a sensation of bloating or gas.  She denies any other abdominal symptoms.

## 2022-04-30 PROBLEM — 116289008: Status: ACTIVE | Noted: 2020-11-04

## 2022-04-30 PROBLEM — 14760008: Status: ACTIVE | Noted: 2020-11-04
